# Patient Record
(demographics unavailable — no encounter records)

---

## 2024-10-09 NOTE — DISCUSSION/SUMMARY
[de-identified] : I had a lengthy discussion with the patient regarding his condition.  Unfortunately his clinical presentation is 1 consistent with a reactivation of a chronic infection.  At this point we need to obtain a stat MRI with contrast to assess for the extent of his infection and to what level this infection extends.  This will in turn dictate the next steps in treatment.  I took cultures of the sinus tract.  A Betadine dressing was placed.  I will discuss the results with him over the phone and discussed next Epson treatment once the MRIs done.  All questions sought and answered.

## 2024-10-09 NOTE — HISTORY OF PRESENT ILLNESS
[de-identified] : This is a 32-year-old patient who presents to me with a acute on chronic issue involving his right ankle.  He had Achilles tendon surgery several years ago which was complicated by a retear which was in turn complicated by infection requiring multiple debridements.  He had been doing well over the past couple months however several months ago began noticing increasing pain over the area.  More recently he endorses suture material coming out of his wound and some drainage.  Denies any constitutional symptoms including fevers chills.

## 2024-10-09 NOTE — DATA REVIEWED
[FreeTextEntry1] : 3 views of the patient's right ankle ordered and reviewed by me personally.  X-rays demonstrate evidence of a prominent soft tissue window at the insertion of the Achilles tendon indicating likely chronic infection or scar tissue.  No fracture or dislocation.  Visible bone tunnels present.

## 2024-10-09 NOTE — PHYSICAL EXAM
[de-identified] : He is alert oriented x 3.  Pleasant cooperative throughout exam.  Examination of his right lower extremity was undertaken.  There is a punctate sinus tract which extends down to the level of the deep tissue.  There is mild expressed purulence to this area.  There is bogginess underneath the entire distal Achilles suggesting a deep infection.  There is prominent FiberWire suture exposed.  No exposed tendon or bone.  Otherwise neurovascular intact distally.

## 2024-11-13 NOTE — HISTORY OF PRESENT ILLNESS
[de-identified] : Postop right Achilles.  Patient is doing well.  He denies any fevers chills.  He has been taking oral antibiotics.  He has been nonweightbearing.

## 2024-11-13 NOTE — PHYSICAL EXAM
[de-identified] : Incisions are healing well.  No drainage.  The central dehiscence and sinus tract is healing well.  No expressed drainage.  Neurovascular intact.

## 2024-11-13 NOTE — DISCUSSION/SUMMARY
[de-identified] : Recommended leaving the sutures in for 1 more week.  I have given him a boot.  I cleaned the incision.  He can weight-bear with the boot with 1 heel wedge.  I will see him back in 5 days.  All questions sought and answered.

## 2024-11-18 NOTE — HISTORY OF PRESENT ILLNESS
[de-identified] : Patient comes in today for second postop exam.  He is doing well.  Denies fevers chills calf pain chest pain shortness of breath.

## 2024-11-18 NOTE — DISCUSSION/SUMMARY
[de-identified] : Sutures removed Steri-Strips placed then the patient was instructed to weight-bear with the boot only.  He can stop taking aspirin as long he is weightbearing.  I will see him back in 3 weeks for reassessment.  All questions sought and answered.

## 2024-12-09 NOTE — DISCUSSION/SUMMARY
[de-identified] : I am encouraged by his current findings.  I recommended continuation of the antibiotics.  I recommended weaning off the boot starting in 1 week.  I will see him back in 1 month.  All questions answered.

## 2024-12-09 NOTE — HISTORY OF PRESENT ILLNESS
[de-identified] : Patient is here for postop exam.  He is now approximately 5 weeks status post a right Achilles tendon debridement and removal of suture material.  He is actually doing quite well.  He has no pain.  He denies any drainage from the incisions.  He has been compliant with wearing the boot and taken the oral antibiotics.

## 2024-12-09 NOTE — PHYSICAL EXAM
[de-identified] : His pain is well-controlled.  He is nontender.  His incisions are fully closed without expressed drainage or erythema.  There is intact Achilles tendon present.  He is neurovascular intact distally.

## 2025-01-13 NOTE — HISTORY OF PRESENT ILLNESS
[de-identified] : Patient is here for follow-up of his right Achilles tendon.  He is doing well.  He notes no numbness tingling fevers chills.  Earlier today however he did pull off part of his scab and saw some bleeding.  Denies any drainage otherwise.  He is otherwise been doing well walking relatively normally.

## 2025-01-13 NOTE — DISCUSSION/SUMMARY
[de-identified] : I reassured the patient at this time.  I told him that if the scab comes off and is bleeding this is nothing inherently be concerned about.  However if there is drainage purulence and foul smelling fluid then he should contact the office immediately.  He will cover the area up for now.  He will work with physical therapy.  I will see him back in 2 months for reassessment.  All questions answered.

## 2025-01-13 NOTE — PHYSICAL EXAM
[de-identified] : He is alert oriented x 3.  Pleasant cooperative.  I examined his right lower extremity.  There is no expressed drainage.  No erythema surrounding the Achilles.  There is some bleeding from a scab that was unroofed but once again no surrounding erythema or drainage from this area.  His Achilles tendon is intact with 5 out of 5 plantarflexion strength and normal Luis's test.